# Patient Record
(demographics unavailable — no encounter records)

---

## 2024-10-15 NOTE — CONSULT LETTER
[Dear  ___] : Dear  [unfilled], [Consult Letter:] : I had the pleasure of evaluating your patient, [unfilled]. [Please see my note below.] : Please see my note below. [Consult Closing:] : Thank you very much for allowing me to participate in the care of this patient.  If you have any questions, please do not hesitate to contact me. [Sincerely,] : Sincerely, [FreeTextEntry2] : Dr. Anthony Bowers [FreeTextEntry3] :  Noemi Powell MD  of Medicine Division of Infectious Diseases [facility] [address] [place] [address] Tel: [phone] Fax: [phone]

## 2024-10-15 NOTE — ASSESSMENT
[FreeTextEntry1] : 63 F presents today for SPENSER and bronchiectasis. PMH of  Hodgkin's lymphoma s/p chemo/XRT @ 18 yo and  Hep C s/p treatment and cure 2018. AVR, CABG.  She has a cough with sob at times.  CT chest with lung nodule, bronchiectasis, and 1 out of 3 sputum with SPENSER.    1) Bronchiectasis:  Given literature to review.   Stressed importance of mucus clearance. CT chest done 8/26. Stable  Stressed importance of nutrition and gaining some weight Had flu vaccine this season already.  2) SPENSER: Started therapy 9/13/24 1 out of 3 sputum +SPENSER.   Repeat sputum collection again.  Did gain back weight now 5 lbs. HOLD SPENSER MEDS UNTIL SEE OPHTHAL. RONALD WAS BETTER WITH GLASSES ON. Suspect she is okay.  If testing okay, can resume SPENSER meds.  Check labs.   She will rtc 6 weeks.

## 2024-10-15 NOTE — PHYSICAL EXAM
[General Appearance - Alert] : alert [General Appearance - In No Acute Distress] : in no acute distress [Sclera] : the sclera and conjunctiva were normal [PERRL With Normal Accommodation] : pupils were equal in size, round, reactive to light [Extraocular Movements] : extraocular movements were intact [Outer Ear] : the ears and nose were normal in appearance [Oropharynx] : the oropharynx was normal with no thrush [Neck Appearance] : the appearance of the neck was normal [Neck Cervical Mass (___cm)] : no neck mass was observed [Jugular Venous Distention Increased] : there was no jugular-venous distention [Thyroid Diffuse Enlargement] : the thyroid was not enlarged [] : no respiratory distress [Auscultation Breath Sounds / Voice Sounds] : lungs were clear to auscultation bilaterally [Heart Rate And Rhythm] : heart rate was normal and rhythm regular [Heart Sounds] : normal S1 and S2 [Edema] : there was no peripheral edema [Bowel Sounds] : normal bowel sounds [Abdomen Soft] : soft [Skin Lesions] : no skin lesions [No Focal Deficits] : no focal deficits [Oriented To Time, Place, And Person] : oriented to person, place, and time [Affect] : the affect was normal [FreeTextEntry1] : +Murmur

## 2024-10-15 NOTE — HISTORY OF PRESENT ILLNESS
[FreeTextEntry1] : 63 F presents today for SPENSER and bronchiectasis.  Hodgkin's lymphoma s/p chemo/XRT @ 18 yo Hep C s/p treatment and cure 2018.  She has a cough. Not often At times feel sob  Uses inhaler Got aerobika but didn't start yet.   Found to have Lung nodule on ct chest. Ct done b/c pt losing weight.  lost 10 lbs - did ct scan. saw lung nodule sent to dr. herrera and dr. ochoa Rec to have bx but deferred and collected sputum.   AVR - bovine and CABG.   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  Gained some wt back.  Had CT chest done - but report not back yet at time of visit.     After visit ct chest resulted 9/5/24 and noted below*** Stable nodule w/ RLL and NATHALIA. Continued surveillance recommended. Stable bronchiectasis and stable air cysts LLL.  10/15/24: Called me 9/13 and started SPENSER meds. Doing okay, some vision blurriness not sure if it is new or old. Some GI upset, better with famotidine.

## 2024-11-26 NOTE — PHYSICAL EXAM
[No Acute Distress] : no acute distress [Normal Oropharynx] : normal oropharynx [Normal Appearance] : normal appearance [No Neck Mass] : no neck mass [Normal Rate/Rhythm] : normal rate/rhythm [Murmur ___ / 6] : murmur [unfilled] / 6 [Normal S1, S2] : normal s1, s2 [No Resp Distress] : no resp distress [Clear to Auscultation Bilaterally] : clear to auscultation bilaterally [Kyphosis] : kyphosis [Benign] : benign [Normal Gait] : normal gait [No Clubbing] : no clubbing [No Cyanosis] : no cyanosis [No Edema] : no edema [FROM] : FROM [Normal Color/ Pigmentation] : normal color/ pigmentation [No Focal Deficits] : no focal deficits [Oriented x3] : oriented x3 [Normal Affect] : normal affect [II] : Mallampati Class: II [TextBox_2] : thin  [TextBox_68] : I:E ratio 1:3; clear

## 2024-11-26 NOTE — ASSESSMENT
[FreeTextEntry1] : Ms. PURDY  is a 63 year old female with a history of  born in [place], nonsmoker, Hodgkin's Lymphoma (Diagnosed at 17 y.o. with 3 relapses) s/p chemo and RT (for 10 years), CAD and AS s/p CABG, HTN, HLD, High Cholesterol, Hep C s/p anti-viral therapy (2021), Sinusitis, Covid-19 (2/2021), Abnormal CT ([facility]) (6 mm nodule RLL and PET 1.2 cm), Bronchiectasis- SOB, Abnormal CT (Bronchiectasis, enlarging pulmonary nodules c/w STANISLAW vs "malignancy")- most c/w STANISLAW/SPENSER - s/p qwh of Rx (Dyspepsia) - now on Rx for STANISLAW 9/13/2024   Her shortness of breath is multifactorial due to: -poor mechanics of breathing -out of shape -pulmonary disease   -Bronchiectasis -cardiac disease    -CAD and AS s/p CABG   problem 1: Bronchiectasis (likely due to prior radiation) -Stiolto 2 puffs QD -s/p blood work: quantitative immunoglobulins (-), IgG subsets (-), strep pneumonia titers (low), cystic fibrosis panel (-) -s/p sputum for AFB x3 (only 2 given) -recommended to get the Aerobika  -Inhaler technique reviewed as well as oral hygiene techniques reviewed with patient. Avoidance of cold air, extremes of temperature, rescue inhaler should be used before exercise. Order of medication reviewed with patient. Recommended use of a cool mist humidifier in the bedroom. Seen on the CT of the chest or chest x-ray signifies damaged bronchial tubes focal or diffuse which can be sites of recurrent infections. These areas can be colonized by various organisms including bacteria (hemophilous influenzae/Pseudomonas species etc.) as well as acid fast bacilli (mycobacterial disease- inclusive of TB/SPENSER etc.). Sputum either for bacteria culture/sensitivity or AFB culture and sensitivity will need to be sent if the patient has sputum- 3 specimens on consecutive days will need to be dropped at the laboratory- if the patient can produce sputum.  Problem 1A: (+) Immunodeficiency -add Prevnar 20   Problem 1B: SPENSER 6/2024 - w/ initiation 9/2024 ([facility]) (9/13/2024) -s/p Ethambutol 800 mg M/W/F -s/p Rifampin 600 mg M/W/F  -s/p Zithromax 500 mg M/W.F -complete follow-up LFTs, ophthalmology evaluations, and CBC q6 weeks- ID f/p Braydon -repeat sputum AFB x3 every 3 months, optho - [facility]a    problem 2: Abnormal CT (Bronchiectasis, Scarring, Enlarging Pulmonary Nodules) DDX Includes: -(+)STANISLAW -?Malignancy  -NC with Bronchoscopy (Murn or Lisset), if unyielding consider CTS evaluation (noncompliant)- s/p CT 9/2024 -recommend CT 6/2024 if results from bronchoscopy are unyielding  -not a candidate for NODIFY due to prior history of Hodkin's Lymphoma - next 3/2025  -This procedure is used to diagnose and treat a variety of lung disease including lung masses, undefined abnormalities on CT scans, as well as evaluation for bleeding. All cardiac patients will require cardiac clearance for the procedure as many are on blood thinners and are at a greater risk of complications. The procedure will be performed by one of my partners who is stationed at the hospital. -CAT scans are the only radiological modality to identify abnormalities w/in the lungs with regards to nodules/masses/lymph nodes. Risks, benefits were reviewed in detail. The guidelines for abnormalities include follow up CT scans at various intervals which could range from 6 weeks to 1 year intervals. If there is a change for the worse then consideration for a biopsy will be considered if you are a candidate. Second opinion evaluation with a thoracic surgeon or an interventional radiologist could be offered.  problem 3: GERD (active) -recommended Reflux Gourmet -Rule of 2s: avoid eating too much, eating too late, eating too spicy, eating two hours before bed. -Things to avoid including overeating, spicy foods, tight clothing, eating within three hours of bed, this list is not all inclusive. -For treatment of reflux, possible options discussed including diet control, H2 blockers, PPIs, as well as coating motility agents discussed as treatment options. Timing of meals and proximity of last meal to sleep were discussed. If symptoms persist, a formal gastrointestinal evaluation is needed.  Problem 4: cardiac disease (CAD and AS s/p CABG) -recommended to continue to follow up with Cardiologist (Lanette)   problem 5: poor breathing mechanics -Proper breathing techniques were reviewed with an emphasis of exhalation. Patient instructed to breath in for 1 second and out for four seconds. Patient was encouraged to not talk while walking.   problem 6: Out of shape -Exercise, and diet control were discussed and are highly encouraged. Treatment options are given such as, aqua therapy, and contacting a nutritionist. Recommended to use the elliptical, stationary bike, less use of treadmill.   problem 7: health maintenance -recommended yearly flu shot after October 15, 2023 -recommended strep pneumonia vaccines: Prevnar-20 vaccine, followed by Pneumo vaccine 23 one year following after 65 years old. -recommended early intervention for Upper Respiratory Infections (URIs) -recommended regular osteoporosis evaluations -recommended early dermatological evaluations -recommended after the age of 50 to the age of 70, colonoscopy every 5 years   F/P in 4 months. She is encouraged to call with any changes, concerns, or questions
[FreeTextEntry1] : Ms. PURDY  is a 63 year old female with a history of  born in [place], nonsmoker, Hodgkin's Lymphoma (Diagnosed at 17 y.o. with 3 relapses) s/p chemo and RT (for 10 years), CAD and AS s/p CABG, HTN, HLD, High Cholesterol, Hep C s/p anti-viral therapy (2021), Sinusitis, Covid-19 (2/2021), Abnormal CT ([facility]) (6 mm nodule RLL and PET 1.2 cm), Bronchiectasis- SOB, Abnormal CT (Bronchiectasis, enlarging pulmonary nodules c/w STANISLAW vs "malignancy")- most c/w STANISLAW/SPENSER - s/p qwh of Rx (Dyspepsia) - now on Rx for STANISLAW 9/13/2024   Her shortness of breath is multifactorial due to: -poor mechanics of breathing -out of shape -pulmonary disease   -Bronchiectasis -cardiac disease    -CAD and AS s/p CABG   problem 1: Bronchiectasis (likely due to prior radiation) -Stiolto 2 puffs QD -s/p blood work: quantitative immunoglobulins (-), IgG subsets (-), strep pneumonia titers (low), cystic fibrosis panel (-) -s/p sputum for AFB x3 (only 2 given) -recommended to get the Aerobika  -Inhaler technique reviewed as well as oral hygiene techniques reviewed with patient. Avoidance of cold air, extremes of temperature, rescue inhaler should be used before exercise. Order of medication reviewed with patient. Recommended use of a cool mist humidifier in the bedroom. Seen on the CT of the chest or chest x-ray signifies damaged bronchial tubes focal or diffuse which can be sites of recurrent infections. These areas can be colonized by various organisms including bacteria (hemophilous influenzae/Pseudomonas species etc.) as well as acid fast bacilli (mycobacterial disease- inclusive of TB/SPENSER etc.). Sputum either for bacteria culture/sensitivity or AFB culture and sensitivity will need to be sent if the patient has sputum- 3 specimens on consecutive days will need to be dropped at the laboratory- if the patient can produce sputum.  Problem 1A: (+) Immunodeficiency -add Prevnar 20   Problem 1B: SPENSER 6/2024 - w/ initiation 9/2024 ([facility]) (9/13/2024) -s/p Ethambutol 800 mg M/W/F -s/p Rifampin 600 mg M/W/F  -s/p Zithromax 500 mg M/W.F -complete follow-up LFTs, ophthalmology evaluations, and CBC q6 weeks- ID f/p Braydon -repeat sputum AFB x3 every 3 months, optho - [facility]    problem 2: Abnormal CT (Bronchiectasis, Scarring, Enlarging Pulmonary Nodules) DDX Includes: -(+)STANISLAW -?Malignancy  -NC with Bronchoscopy (Murn or Lisset), if unyielding consider CTS evaluation (noncompliant)- s/p CT 9/2024 -recommend CT 6/2024 if results from bronchoscopy are unyielding  -not a candidate for NODIFY due to prior history of Hodkin's Lymphoma - next 3/2025  -This procedure is used to diagnose and treat a variety of lung disease including lung masses, undefined abnormalities on CT scans, as well as evaluation for bleeding. All cardiac patients will require cardiac clearance for the procedure as many are on blood thinners and are at a greater risk of complications. The procedure will be performed by one of my partners who is stationed at the hospital. -CAT scans are the only radiological modality to identify abnormalities w/in the lungs with regards to nodules/masses/lymph nodes. Risks, benefits were reviewed in detail. The guidelines for abnormalities include follow up CT scans at various intervals which could range from 6 weeks to 1 year intervals. If there is a change for the worse then consideration for a biopsy will be considered if you are a candidate. Second opinion evaluation with a thoracic surgeon or an interventional radiologist could be offered.  problem 3: GERD (active) -recommended Reflux Gourmet -Rule of 2s: avoid eating too much, eating too late, eating too spicy, eating two hours before bed. -Things to avoid including overeating, spicy foods, tight clothing, eating within three hours of bed, this list is not all inclusive. -For treatment of reflux, possible options discussed including diet control, H2 blockers, PPIs, as well as coating motility agents discussed as treatment options. Timing of meals and proximity of last meal to sleep were discussed. If symptoms persist, a formal gastrointestinal evaluation is needed.  Problem 4: cardiac disease (CAD and AS s/p CABG) -recommended to continue to follow up with Cardiologist (Lanette)   problem 5: poor breathing mechanics -Proper breathing techniques were reviewed with an emphasis of exhalation. Patient instructed to breath in for 1 second and out for four seconds. Patient was encouraged to not talk while walking.   problem 6: Out of shape -Exercise, and diet control were discussed and are highly encouraged. Treatment options are given such as, aqua therapy, and contacting a nutritionist. Recommended to use the elliptical, stationary bike, less use of treadmill.   problem 7: health maintenance -recommended yearly flu shot after October 15, 2023 -recommended strep pneumonia vaccines: Prevnar-20 vaccine, followed by Pneumo vaccine 23 one year following after 65 years old. -recommended early intervention for Upper Respiratory Infections (URIs) -recommended regular osteoporosis evaluations -recommended early dermatological evaluations -recommended after the age of 50 to the age of 70, colonoscopy every 5 years   F/P in 4 months. She is encouraged to call with any changes, concerns, or questions
Change in provider or treatment (i.e., medications, psychotherapy, milieu)

## 2024-11-26 NOTE — ADDENDUM
[FreeTextEntry1] : Documented by Richi Fulton acting as a scribe for Dr. Anthony Bowers on 11/26/2024. All medical record entries made by the Scribe were at my, Dr. Anthony Bowers's, direction and personally dictated by me on 11/26/2024. I have reviewed the chart and agree that the record accurately reflects my personal performance of the history, physical exam, assessment and plan. I have also personally directed, reviewed, and agree with the discharge instructions.

## 2024-11-26 NOTE — PROCEDURE
[FreeTextEntry1] : Full PFT reveals mild restrictive dysfunction; FEV1 was 1.54 L which is 75% of predicted; mild to moderately reduced lung volumes; mildly reduced diffusion at 10.34, which is 57% of predicted; normal flow volume loop. PFTs were performed to evaluate for SOB  FENO was 14; a normal value being less than 25 Fractional exhaled nitric oxide (FENO) is regarded as a simple, noninvasive method for assessing eosinophilic airway inflammation. Produced by a variety of cells within the lung, nitric oxide (NO) concentrations are generally low in healthy individuals. However, high concentrations of NO appear to be involved in nonspecific host defense mechanisms and chronic inflammatory diseases such as asthma. The American Thoracic Society (ATS) therefore has recommended using FENO to aid in the diagnosis and monitoring of eosinophilic airway inflammation and asthma, and for identifying steroid responsive individuals whose chronic respiratory symptoms may be caused by airway inflammation.   The American Thoracic Society (ATS) strongly recommends the use of FeNO measurement to aid in the assessment, management, and long-term monitoring of asthma. In their 2011 clinical practice guideline, the ATS emphasizes the importance of using FeNO.

## 2024-11-26 NOTE — HISTORY OF PRESENT ILLNESS
[TextBox_4] : Ms. PURDY is a 63-year-old female with a history of SOB, Abnormal CT (Bronchiectasis, enlarging pulmonary nodules c/w STANISLAW) presenting to the office today for a follow-up pulmonary evaluation. Her chief complaint is  -she notes feeling improved  -she notes her breathing is stable -she notes exercising (walking) -she notes having a burning sensation in her chest when holding something  -she notes vision is stable -she notes seeing an eye doctor  -she notes diet is good -she notes appetite is stable -she notes good quality of sleep -she notes GERD Sx are quite   -she denies any headaches, nausea, emesis, fever, chills, sweats, coughing, wheezing, palpitations, diarrhea, constipation, dysphagia, vertigo, arthralgias, myalgias, leg swelling, itchy eyes, itchy ears, heartburn, reflux, or sour taste in the mouth.

## 2024-11-26 NOTE — REASON FOR VISIT
[Follow-Up] : a follow-up visit [Family Member] : family member [TextBox_44] : SOB, Abnormal CT (Bronchiectasis, enlarging pulmonary nodules c/w STANISLAW vs "malignancy")

## 2025-02-25 NOTE — ASSESSMENT
[FreeTextEntry1] : Ms. PURDY  is a 63 year old female with a history of  born in [place], nonsmoker, Hodgkin's Lymphoma (Diagnosed at 17 y.o. with 3 relapses) s/p chemo and RT (for 10 years), CAD and AS s/p CABG, HTN, HLD, High Cholesterol, Hep C s/p anti-viral therapy (2021), Sinusitis, Covid-19 (2/2021), Abnormal CT ([facility]) (6 mm nodule RLL and PET 1.2 cm), Bronchiectasis- SOB, Abnormal CT (Bronchiectasis, enlarging pulmonary nodules c/w STANISLAW vs "malignancy")- most c/w STANISLAW/SPENSER - s/p qwh of Rx (Dyspepsia) - now on Rx for STANISLAW 9/13/2024   Her shortness of breath is multifactorial due to: -poor mechanics of breathing -out of shape -pulmonary disease   -Bronchiectasis -cardiac disease    -CAD and AS s/p CABG   problem 1: Bronchiectasis (likely due to prior radiation) -Stiolto 2 puffs QD -s/p blood work: quantitative immunoglobulins (-), IgG subsets (-), strep pneumonia titers (low), cystic fibrosis panel (-) -s/p sputum for AFB x3 (only 2 given) (-) -recommended to get the Aerobika  -Inhaler technique reviewed as well as oral hygiene techniques reviewed with patient. Avoidance of cold air, extremes of temperature, rescue inhaler should be used before exercise. Order of medication reviewed with patient. Recommended use of a cool mist humidifier in the bedroom. Seen on the CT of the chest or chest x-ray signifies damaged bronchial tubes focal or diffuse which can be sites of recurrent infections. These areas can be colonized by various organisms including bacteria (hemophilous influenzae/Pseudomonas species etc.) as well as acid fast bacilli (mycobacterial disease- inclusive of TB/SPENSER etc.). Sputum either for bacteria culture/sensitivity or AFB culture and sensitivity will need to be sent if the patient has sputum- 3 specimens on consecutive days will need to be dropped at the laboratory- if the patient can produce sputum.  Problem 1A: (+) Immunodeficiency -add Prevnar 20   Problem 1B: SPENSER 6/2024 - w/ initiation 9/2024 ([facility]) (9/13/2024) -s/p Ethambutol 800 mg M/W/F - move to 400/800/400 weekly  -s/p Rifampin 600 mg M/W/F  -s/p Zithromax 500 mg M/W.F -complete follow-up LFTs, ophthalmology evaluations, and CBC q6 weeks- ID f/p Andre et al -repeat sputum AFB x3 every 3 months, optho - [facility]a    problem 2: Abnormal CT (Bronchiectasis, Scarring, Enlarging Pulmonary Nodules) DDX Includes: -(+)STANISLAW -?Malignancy  -NC with Bronchoscopy (Murn or Lisset), if unyielding consider CTS evaluation (noncompliant)- s/p CT 9/2024 -recommend CT 6/2024 if results from bronchoscopy are unyielding  -not a candidate for NODIFY due to prior history of Hodkin's Lymphoma - next 3/2025  -This procedure is used to diagnose and treat a variety of lung disease including lung masses, undefined abnormalities on CT scans, as well as evaluation for bleeding. All cardiac patients will require cardiac clearance for the procedure as many are on blood thinners and are at a greater risk of complications. The procedure will be performed by one of my partners who is stationed at the hospital. -CAT scans are the only radiological modality to identify abnormalities w/in the lungs with regards to nodules/masses/lymph nodes. Risks, benefits were reviewed in detail. The guidelines for abnormalities include follow up CT scans at various intervals which could range from 6 weeks to 1 year intervals. If there is a change for the worse then consideration for a biopsy will be considered if you are a candidate. Second opinion evaluation with a thoracic surgeon or an interventional radiologist could be offered.  problem 3: GERD (active) -recommended Reflux Gourmet -Rule of 2s: avoid eating too much, eating too late, eating too spicy, eating two hours before bed. -Things to avoid including overeating, spicy foods, tight clothing, eating within three hours of bed, this list is not all inclusive. -For treatment of reflux, possible options discussed including diet control, H2 blockers, PPIs, as well as coating motility agents discussed as treatment options. Timing of meals and proximity of last meal to sleep were discussed. If symptoms persist, a formal gastrointestinal evaluation is needed.  Problem 4: cardiac disease (CAD and AS s/p CABG) -recommended to continue to follow up with Cardiologist ([place])   problem 5: poor breathing mechanics -Proper breathing techniques were reviewed with an emphasis of exhalation. Patient instructed to breath in for 1 second and out for four seconds. Patient was encouraged to not talk while walking.   problem 6: Out of shape -Exercise, and diet control were discussed and are highly encouraged. Treatment options are given such as, aqua therapy, and contacting a nutritionist. Recommended to use the elliptical, stationary bike, less use of treadmill.  problem 6A: Anorexia  -complete nutritionist evaluation  -complete CMP  -add Remeron 15 qDay   problem 7: health maintenance -recommended yearly flu shot after October 15, 2023 -recommended strep pneumonia vaccines: Prevnar-20 vaccine, followed by Pneumo vaccine 23 one year following after 65 years old. -recommended early intervention for Upper Respiratory Infections (URIs) -recommended regular osteoporosis evaluations -recommended early dermatological evaluations -recommended after the age of 50 to the age of 70, colonoscopy every 5 years   F/P in 4 months. She is encouraged to call with any changes, concerns, or questions

## 2025-02-25 NOTE — PROCEDURE
[FreeTextEntry1] : Full PFT reveals mild restrictive dysfunction; FEV1 was  1.36L which is 66% of predicted; normal lung volumes; normal diffusion at 1.76, which is 42% of predicted; normal flow volume loop. PFTs were performed to evaluate for SOB  FENO was 15; a normal value being less than 25 Fractional exhaled nitric oxide (FENO) is regarded as a simple, noninvasive method for assessing eosinophilic airway inflammation. Produced by a variety of cells within the lung, nitric oxide (NO) concentrations are generally low in healthy individuals. However, high concentrations of NO appear to be involved in nonspecific host defense mechanisms and chronic inflammatory diseases such as asthma. The American Thoracic Society (ATS) therefore has recommended using FENO to aid in the diagnosis and monitoring of eosinophilic airway inflammation and asthma, and for identifying steroid responsive individuals whose chronic respiratory symptoms may be caused by airway inflammation.   The American Thoracic Society (ATS) strongly recommends the use of FeNO measurement to aid in the assessment, management, and long-term monitoring of asthma. In their 2011 clinical practice guideline, the ATS emphasizes the importance of using FeNO.

## 2025-02-25 NOTE — ADDENDUM
[FreeTextEntry1] : Documented by Richi Fulton acting as a scribe for Dr. Anthony Bowers on 02/25/2025. All medical record entries made by the Scribe were at my, Dr. Anthony Bowers's, direction and personally dictated by me on 02/25/2025. I have reviewed the chart and agree that the record accurately reflects my personal performance of the history, physical exam, assessment and plan. I have also personally directed, reviewed, and agree with the discharge instructions.

## 2025-02-25 NOTE — PHYSICAL EXAM
[No Acute Distress] : no acute distress [Normal Oropharynx] : normal oropharynx [II] : Mallampati Class: II [Normal Appearance] : normal appearance [No Neck Mass] : no neck mass [Normal Rate/Rhythm] : normal rate/rhythm [Murmur ___ / 6] : murmur [unfilled] / 6 [Normal S1, S2] : normal s1, s2 [No Resp Distress] : no resp distress [Clear to Auscultation Bilaterally] : clear to auscultation bilaterally [Kyphosis] : kyphosis [Benign] : benign [Normal Gait] : normal gait [No Clubbing] : no clubbing [No Cyanosis] : no cyanosis [No Edema] : no edema [FROM] : FROM [Normal Color/ Pigmentation] : normal color/ pigmentation [No Focal Deficits] : no focal deficits [Oriented x3] : oriented x3 [Normal Affect] : normal affect [TextBox_2] : thin  [TextBox_68] : I:E ratio 1:3; clear

## 2025-02-25 NOTE — HISTORY OF PRESENT ILLNESS
[TextBox_4] : Ms. PURDY is a 63-year-old female with a history of SOB, Abnormal CT (Bronchiectasis, enlarging pulmonary nodules c/w STANISLAW) presenting to the office today for a follow-up pulmonary evaluation. Her chief complaint is  -she notes feeling generally well -she notes losing weight -she notes episodes of constipation and diarrhea  -she notes intermittent stomach pain  -she notes good quality of sleep -she notes sleeping for 8-10 hours -she notes appetite is poor  -she notes diet is poor  -she notes her breathing is stable  -she denies any headaches, nausea, emesis, fever, chills, sweats, chest pain, chest pressure, coughing, wheezing, palpitations, dysphagia, vertigo, arthralgias, myalgias, leg swelling, itchy eyes, itchy ears, heartburn, reflux, or sour taste in the mouth.

## 2025-03-17 NOTE — ASSESSMENT
[FreeTextEntry1] : 1.  Subclinical hypothyroidism Labs reviewed from 2/28/2025: Mildly elevated TSH 4.46 with normal T4 and T3 levels. Imaging reviewed chest CT 3/2025 and whole-body PET/CT 7/2022 without any thyroid abnormalities identified. RECOMMENDATIONS: -We discussed that mildly elevated TSH is not very harmful and can be monitored without treatment.  We discussed indications for starting levothyroxine for hypothyroidism, such as persistently elevated TSH greater than 10, and/or significant, specific symptoms of hypothyroidism as a trial to see if any improvement in symptoms with treatment.  She is anyway not very keen on taking prescription medications if not needed, and I agreed with her that at this time we likely do not need to treat her unless repeat TSH is more elevated or if she has overt hypothyroidism. -Recheck TSH, free T4, total T3, thyroid antibodies -If mildly elevated TSH, can monitor every 6 to 12 months without treatment  2.  Osteoporosis -She reports that she is seeing an osteoporosis specialist with [facility] -Reports that she had a recent bone density scan in 2025, asked to send us records -She was recommended Evenity by her osteoporosis specialist, but she is not inclined to take medication at this time and would rather take supplements -Recommend continue calcium in diet, continue over-the-counter vitamin D supplements -No history of fractures.  Continue weightbearing exercise, fall precautions.  RTC in 6 months.  Ambrocio Farmer MD [facility] at [address], [address] Ph: [phone] Fax: [phone]

## 2025-03-17 NOTE — HISTORY OF PRESENT ILLNESS
[FreeTextEntry1] : CHIEF COMPLAINT: Subclinical hypothyroidism REFERRED BY: Dr. Anthony Bowers Available prior records extensively reviewed, scanned in chart where necessary.    HISTORY OF PRESENTING ILLNESS: The patient is a 63-year-old female being seen in the office today for evaluation of subclinical hypothyroidism.  She has a history of Hodgkin's lymphoma affecting axillary lymph nodes, treated with radiation at age 17 in [place].  No thyroid/neck shield was used, so she likely has radiation exposure to her neck area.  Noted to have recent labs 2/28/2025: Elevated TSH 4.46, free T4 normal 1.1, free T3 normal 2.58. She denies any prior history of thyroid disease, has never been on thyroid medications. She does not know of any history of thyroid disease in her family. No known history of thyroid nodules. She does not take biotin.   Symptoms: She reports that she feels tired easily, feels slow and has to rest to gain her energy back.  Endorsing constipation, always feeling cold.  Endorsing weight loss.  No neck swelling, no dysphagia or dyspnea or change in voice.  No hair or skin changes.  She has occasional palpitations, no tremors.  OSTEOPOROSIS: Most recent DEXA scan available: 10/2021 with LS -2 and FN -0.9 She reports that she has had a bone density scan 2025 and follows with an osteoporosis specialist who suggested Evenity, but she is not very keen on taking medications. No history of fractures. She takes over-the-counter vitamin D supplements.  IMAGING REVIEWED: CT chest 3/2025 for lung nodules without any clear mention of thyroid abnormality. FDG PET/CT 7/2022 without any FDG avid mass in the head and neck region.  Thyroid gland is unremarkable.

## 2025-05-01 NOTE — ASSESSMENT
[FreeTextEntry1] :  Ms. STEFFANIE PURDY 63 year old female , never smoker, with Hx of HTN, HLD, CAD, SPENSER 6/2024 treated with Ethambutol, Rifampin, hypothyroidism, GERD without esophagitis, Hodgkin Lymphoma s/p chemo + RT in 1979-89. Refer by Dr. Anthony Bowers (Ref/Pulm) for consideration for biopsy of lung nodule  PFT 2/25/25: FVC 55% FEV1 66% DLCO 41%   CT Chest 3/5/2025 (NW):  -Redemonstrated bilateral paramediastinal postradiation therapy changes and bronchiectasis.  - A few mucoid impacted airways in the left upper lobe. - an ill-defined previously described irregular left upper lobe opacity that is inseparable from the mediastinal fat adjacent to the radiation therapy changes measuring 1.5 cm (301:37) in the region of previously seen FDG avid lymph node on the PET/CT dated 7/18/2022. This opacity is unchanged in size since August 2024 but is mildly increased in size since January 2024. - a 1.6 cm irregular nodule in the right lower lobe (301:58) that is significantly increased in size since May 23, 2022 but unchanged since August 2024. - Unchanged right lower lobe nodule measuring 0.8 cm (301:70). - Stable left lower lobe nodular densities. - Right-sided pleural thickening vs trace pleural effusion is unchanged since August 2024.   PET/CT 4/13/2025 (NW): - A few FDG avid lesions in the anterior mediastinum. * FDG avid left anterior mediastinal nodule measuring 2.1 x 1.6 cm on noncontrast CT, SUV 16.1 (image 68); previously measuring 1.4 x 0.9 cm, SUV 4.3. - FDG avid right anterior mediastinal soft tissue measuring 1.1 x 0.7 cm, SUV 4.4 (image 68); previously not present. - Mild FDG uptake in median sternotomy, postsurgical. Status post TAVR.  - Right lower lobe nodule measures 1.9 x 1.2 cm, SUV 3.2 (image 81); previously 1.6 x 1.4 cm on CT 3/5/2025; previously not FDG avid on PET/CT 7/18/2022 and measuring 1.0 x 0.7 cm. - Mildly FDG avid bilateral upper lobe pleural thickening. Eg. right upper lobe pleural thickening measuring approximately 1.5 x 1.0 cm, SUV 2.3 (image 64); previously 1.0 x 0.8 cm and non-FDG avid. - Similar 0.8 cm anterior right lung nodule, SUV 1.3 (image 93); previously SUV 1.2. - Paramediastinal lung reticulation, likely due to prior radiation therapy.  Patient is here today for CT Surgery consultation. Patient reports SOB and cough. Denies chest pain or difficulty in breathing.  I have reviewed the patient's medical records and diagnostic images at time of this office consultation and have made the following recommendation: 1.  I personally reviewed CT and PET images with the patient today and images demonstrated concerning RLL nodule. I recommend a biopsy for further evaluate the nodule. I recommend Navigation Bronchoscopy, robotic bronch, with EBUS with RLL biopsy by Dr Darling/Dr. Gamble. Risks of procedure includes but not limited to pain, infection, bleeding, injuries to surrounding structures, and need for further procedure, stroke or death. Benefits and alternatives explained to patient, all questions answered, patient agreed to proceed with bronchoscopy.  2. Will recommend CT-Guided biopsy of mediastinal lymph node by Dr. Alonso after bronchoscopy. Follow up with me after the biopsy.  I, JOHNIE Saini, personally performed the evaluation and management (E/M) services for this new patient.  That E/M includes conducting the initial examination, assessing all conditions, and establishing the plan of care.  Today, my ACP, ALLISON Robertson was here to observe my evaluation and management services for this patient to be followed going forward.

## 2025-05-01 NOTE — CONSULT LETTER
[Dear  ___] : Dear  [unfilled], [Consult Letter:] : I had the pleasure of evaluating your patient, [unfilled]. [Please see my note below.] : Please see my note below. [Consult Closing:] : Thank you very much for allowing me to participate in the care of this patient.  If you have any questions, please do not hesitate to contact me. [Sincerely,] : Sincerely, [FreeTextEntry2] : Anthony Madrigal [FreeTextEntry3] : Vadim Sol MD, MPH System Director of Thoracic Surgery Director of Comprehensive Lung and Foregut [place] Professor Cardiovascular & Thoracic Surgery [facility] [employer] at [facility]

## 2025-05-01 NOTE — CONSULT LETTER
[Dear  ___] : Dear  [unfilled], [Consult Letter:] : I had the pleasure of evaluating your patient, [unfilled]. [Please see my note below.] : Please see my note below. [Consult Closing:] : Thank you very much for allowing me to participate in the care of this patient.  If you have any questions, please do not hesitate to contact me. [Sincerely,] : Sincerely, [FreeTextEntry2] : Anthony Madrigal [FreeTextEntry3] : Vadim Sol MD, MPH System Director of Thoracic Surgery Director of Comprehensive Lung and Foregut [place] Professor Cardiovascular & Thoracic Surgery [facility] [employer] at [facility]

## 2025-05-01 NOTE — CONSULT LETTER
[Dear  ___] : Dear  [unfilled], [Consult Letter:] : I had the pleasure of evaluating your patient, [unfilled]. [Please see my note below.] : Please see my note below. [Consult Closing:] : Thank you very much for allowing me to participate in the care of this patient.  If you have any questions, please do not hesitate to contact me. [Sincerely,] : Sincerely, [FreeTextEntry2] : Anthony Madrigal [FreeTextEntry3] : Vadim Sol MD, MPH System Director of Thoracic Surgery Director of Comprehensive Lung and Foregut [place] Professor Cardiovascular & Thoracic Surgery [facility]

## 2025-05-01 NOTE — PHYSICAL EXAM
[Fully active, able to carry on all pre-disease performance without restriction] : Status 0 - Fully active, able to carry on all pre-disease performance without restriction [General Appearance - Alert] : alert [Sclera] : the sclera and conjunctiva were normal [Outer Ear] : the ears and nose were normal in appearance [Neck Appearance] : the appearance of the neck was normal [] : no respiratory distress [Respiration, Rhythm And Depth] : normal respiratory rhythm and effort [Apical Impulse] : the apical impulse was normal [Heart Rate And Rhythm] : heart rate was normal and rhythm regular [Examination Of The Chest] : the chest was normal in appearance [Chest Visual Inspection Thoracic Asymmetry] : no chest asymmetry [Breast Appearance] : normal in appearance [Abdomen Soft] : soft [FreeTextEntry1] : Defer [Cervical Lymph Nodes Enlarged Posterior Bilaterally] : posterior cervical [No CVA Tenderness] : no ~M costovertebral angle tenderness [Abnormal Walk] : normal gait [Skin Color & Pigmentation] : normal skin color and pigmentation [Cranial Nerves] : cranial nerves 2-12 were intact [Oriented To Time, Place, And Person] : oriented to person, place, and time

## 2025-05-01 NOTE — HISTORY OF PRESENT ILLNESS
[FreeTextEntry1] :  Ms. STEFFANIE PURDY 63 year old female , never smoker, with Hx of HTN, HLD, CAD, SPENSER 6/2024 treated with Ethambutol, Rifampin, hypothyroidism, GERD without esophagitis, Hodgkin Lymphoma s/p chemo + RT in 1979-89. Refer by Dr. Anthony Bowers (Ref/Pulm) for consideration for biopsy of lung nodule  PFT 2/25/25: FVC 55% FEV1 66% DLCO 41%   CT Chest 3/5/2025 (NW):  -Redemonstrated bilateral paramediastinal postradiation therapy changes and bronchiectasis.  - A few mucoid impacted airways in the left upper lobe. - an ill-defined previously described irregular left upper lobe opacity that is inseparable from the mediastinal fat adjacent to the radiation therapy changes measuring 1.5 cm (301:37) in the region of previously seen FDG avid lymph node on the PET/CT dated 7/18/2022. This opacity is unchanged in size since August 2024 but is mildly increased in size since January 2024. - a 1.6 cm irregular nodule in the right lower lobe (301:58) that is significantly increased in size since May 23, 2022 but unchanged since August 2024. - Unchanged right lower lobe nodule measuring 0.8 cm (301:70). - Stable left lower lobe nodular densities. - Right-sided pleural thickening vs trace pleural effusion is unchanged since August 2024.   PET/CT 4/13/2025 (NW): - A few FDG avid lesions in the anterior mediastinum. * FDG avid left anterior mediastinal nodule measuring 2.1 x 1.6 cm on noncontrast CT, SUV 16.1 (image 68); previously measuring 1.4 x 0.9 cm, SUV 4.3. - FDG avid right anterior mediastinal soft tissue measuring 1.1 x 0.7 cm, SUV 4.4 (image 68); previously not present. - Mild FDG uptake in median sternotomy, postsurgical. Status post TAVR.  - Right lower lobe nodule measures 1.9 x 1.2 cm, SUV 3.2 (image 81); previously 1.6 x 1.4 cm on CT 3/5/2025; previously not FDG avid on PET/CT 7/18/2022 and measuring 1.0 x 0.7 cm. - Mildly FDG avid bilateral upper lobe pleural thickening. Eg. right upper lobe pleural thickening measuring approximately 1.5 x 1.0 cm, SUV 2.3 (image 64); previously 1.0 x 0.8 cm and non-FDG avid. - Similar 0.8 cm anterior right lung nodule, SUV 1.3 (image 93); previously SUV 1.2. - Paramediastinal lung reticulation, likely due to prior radiation therapy.  Patient is here today for CT Surgery consultation. Patient reports SOB and cough. Denies chest pain or difficulty in breathing.

## 2025-05-01 NOTE — DATA REVIEWED
[FreeTextEntry1] :  I have independently reviewed the following: CT Chest 3/5/2025 (NW) PFT 2/25/25 PET/CT 4/13/2025 (NW)

## 2025-06-09 NOTE — REASON FOR VISIT
[Follow-Up] : a follow-up visit [Family Member] : family member [TextBox_44] : via video call- SOB, Abnormal CT (Bronchiectasis, enlarging pulmonary nodules c/w STANISLAW vs "malignancy")

## 2025-06-09 NOTE — ASSESSMENT
[FreeTextEntry1] : Ms. PURDY  is a 63-year-old female with a history of  born in [place], nonsmoker, Hodgkin's Lymphoma (Diagnosed at 17 y.o. with 3 relapses) s/p chemo and RT (for 10 years), CAD and AS s/p CABG, HTN, HLD, High Cholesterol, Hep C s/p anti-viral therapy (2021), Sinusitis, Covid-19 (2/2021), Abnormal CT ([facility]) (6 mm nodule RLL and PET 1.2 cm), Bronchiectasis- SOB, Abnormal CT (Bronchiectasis, enlarging pulmonary nodules c/w STANISLAW vs "malignancy")- most c/w STANISLAW/SPENSER - s/p qwh of Rx (Dyspepsia) - Rx for STANISLAW 9/13/2024- pending [facility] 6/17/2025   Her shortness of breath is multifactorial due to: -poor mechanics of breathing -out of shape -pulmonary disease   -Bronchiectasis -cardiac disease    -CAD and AS s/p CABG   problem 1: Bronchiectasis (likely due to prior radiation) -Stiolto 2 puffs QD -s/p blood work: quantitative immunoglobulins (-), IgG subsets (-), strep pneumonia titers (low), cystic fibrosis panel (-) -s/p sputum for AFB x3 (only 2 given) (-) -recommended to get the Aerobika  -Inhaler technique reviewed as well as oral hygiene techniques reviewed with patient. Avoidance of cold air, extremes of temperature, rescue inhaler should be used before exercise. Order of medication reviewed with patient. Recommended use of a cool mist humidifier in the bedroom. Seen on the CT of the chest or chest x-ray signifies damaged bronchial tubes focal or diffuse which can be sites of recurrent infections. These areas can be colonized by various organisms including bacteria (hemophilous influenzae/Pseudomonas species etc.) as well as acid fast bacilli (mycobacterial disease- inclusive of TB/SPENSER etc.). Sputum either for bacteria culture/sensitivity or AFB culture and sensitivity will need to be sent if the patient has sputum- 3 specimens on consecutive days will need to be dropped at the laboratory- if the patient can produce sputum.  Problem 1A: (+) Immunodeficiency -add Prevnar 20   Problem 1B: SPENSER 6/2024 - w/ initiation 9/2024 ([facility]) (9/13/2024) -s/p Ethambutol 800 mg M/W/F - move to 400/800/400 weekly  -s/p Rifampin 600 mg M/W/F  -s/p Zithromax 500 mg M/W.F -complete follow-up LFTs, ophthalmology evaluations, and CBC q6 weeks- ID f/p Andre et al -repeat sputum AFB x3 every 3 months, optho - D'Aversa    problem 2: Abnormal CT (Bronchiectasis, Scarring, Enlarging Pulmonary Nodules) DDX Includes: -(+)STANISLAW -?Malignancy  -NC with Bronchoscopy ([facility] or [facility]), if unyielding consider CTS evaluation (noncompliant)- s/p CT 9/2024, 3/2025 -not a candidate for NODIFY due to prior history of Hodkin's Lymphoma- s/p evaluation with Dr. Sol/Jeanna et al. -pending needle Bx with Dr. Darling Pastrana ([facility]) 6/17/2025 -This procedure is used to diagnose and treat a variety of lung disease including lung masses, undefined abnormalities on CT scans, as well as evaluation for bleeding. All cardiac patients will require cardiac clearance for the procedure as many are on blood thinners and are at a greater risk of complications. The procedure will be performed by one of my partners who is stationed at the hospital. -CAT scans are the only radiological modality to identify abnormalities w/in the lungs with regards to nodules/masses/lymph nodes. Risks, benefits were reviewed in detail. The guidelines for abnormalities include follow up CT scans at various intervals which could range from 6 weeks to 1 year intervals. If there is a change for the worse then consideration for a biopsy will be considered if you are a candidate. Second opinion evaluation with a thoracic surgeon or an interventional radiologist could be offered.  problem 3: GERD (active) -recommended Reflux Gourmet -Rule of 2s: avoid eating too much, eating too late, eating too spicy, eating two hours before bed. -Things to avoid including overeating, spicy foods, tight clothing, eating within three hours of bed, this list is not all inclusive. -For treatment of reflux, possible options discussed including diet control, H2 blockers, PPIs, as well as coating motility agents discussed as treatment options. Timing of meals and proximity of last meal to sleep were discussed. If symptoms persist, a formal gastrointestinal evaluation is needed.  Problem 4: cardiac disease (CAD and AS s/p CABG) -recommended to continue to follow up with Cardiologist ([place] )   problem 5: poor breathing mechanics -Proper breathing techniques were reviewed with an emphasis of exhalation. Patient instructed to breath in for 1 second and out for four seconds. Patient was encouraged to not talk while walking.   problem 6: Out of shape -Exercise, and diet control were discussed and are highly encouraged. Treatment options are given such as, aqua therapy, and contacting a nutritionist. Recommended to use the elliptical, stationary bike, less use of treadmill.  problem 6A: Anorexia  -complete nutritionist evaluation  -complete CMP  -add Remeron 15 qDay   problem 7: health maintenance -s/p yearly flu shot 2024 -recommended strep pneumonia vaccines: Prevnar-21 vaccine after 65 years old. -recommended early intervention for Upper Respiratory Infections (URIs) -recommended regular osteoporosis evaluations -recommended early dermatological evaluations -recommended after the age of 50 to the age of 70, colonoscopy every 5 years   F/P in 4 months. She is encouraged to call with any changes, concerns, or questions

## 2025-06-09 NOTE — HISTORY OF PRESENT ILLNESS
[Home] : at home, [unfilled] , at the time of the visit. [Medical Office: ([facility])___] : at the medical office located in  [Telehealth (audio & video)] : This visit was provided via telehealth using real-time 2-way audio visual technology. [Verbal consent obtained from patient] : the patient, [unfilled] [TextBox_4] : Ms. PURDY is a 63-year-old female with a history of SOB, Abnormal CT (Bronchiectasis, enlarging pulmonary nodules c/w STANISLAW) presenting to the office today via video call for a follow-up pulmonary evaluation.   -she notes feeling generally well -she notes good quality of sleep -she notes sleeping for 8 hours, sometimes more -she notes exercising (walking) without limitations -she denies coughing  -she notes bowels are regular -she notes vision is stable -she notes occasional headaches -she notes she's lost weight prior, but she's stable now -she notes her appetite is lower than prior -she notes sufficient protein intake from drinks and smoothies -she notes s/p evaluation with Dr. Vadim Sol, who thinks her RLL nodule is malignant -she was told she needs authorization for the biopsy of her right lung nodule with Dr. Sol -she notes s/p evaluation with Dr. Paul Meeks -she notes s/p evaluation with Dr. Darling Pastrana at [facility], who wants to do the needle biopsy 6/17/25 -she notes she will stop taking curcumin and omega-3 supplements 6/11

## 2025-06-09 NOTE — ADDENDUM
[FreeTextEntry1] : Documented by Kateryna Day acting as a scribe for Dr. Anthony Bowers on 06/09/2025. All medical record entries made by the Scribe were at my, Dr. Anthony Bowers's, direction and personally dictated by me on 06/09/2025. I have reviewed the chart and agree that the record accurately reflects my personal performance of the history, physical exam, assessment and plan. I have also personally directed, reviewed, and agree with the discharge instructions.